# Patient Record
Sex: FEMALE | Race: WHITE | NOT HISPANIC OR LATINO | Employment: FULL TIME | URBAN - NONMETROPOLITAN AREA
[De-identification: names, ages, dates, MRNs, and addresses within clinical notes are randomized per-mention and may not be internally consistent; named-entity substitution may affect disease eponyms.]

---

## 2024-09-10 ENCOUNTER — OFFICE VISIT (OUTPATIENT)
Dept: OBSTETRICS AND GYNECOLOGY | Facility: CLINIC | Age: 34
End: 2024-09-10
Payer: COMMERCIAL

## 2024-09-10 VITALS
DIASTOLIC BLOOD PRESSURE: 80 MMHG | WEIGHT: 137.6 LBS | BODY MASS INDEX: 23.49 KG/M2 | HEIGHT: 64 IN | SYSTOLIC BLOOD PRESSURE: 124 MMHG

## 2024-09-10 DIAGNOSIS — Z12.4 SCREENING FOR CERVICAL CANCER: ICD-10-CM

## 2024-09-10 DIAGNOSIS — Z01.419 WELL WOMAN EXAM WITH ROUTINE GYNECOLOGICAL EXAM: Primary | ICD-10-CM

## 2024-09-10 PROCEDURE — 99395 PREV VISIT EST AGE 18-39: CPT | Performed by: PHYSICIAN ASSISTANT

## 2024-09-10 RX ORDER — DESOGESTREL AND ETHINYL ESTRADIOL 0.15-0.03
1 KIT ORAL DAILY
Qty: 84 TABLET | Refills: 3 | Status: SHIPPED | OUTPATIENT
Start: 2024-09-10

## 2024-09-10 NOTE — PROGRESS NOTES
"Subjective   Chief Complaint   Patient presents with    Gynecologic Exam     Last pap done 22-WNL, No complaints       Jackie Newton is a 34 y.o. year old  presenting to be seen for her annual gynecological exam.   Has no complaints or concerns  Desires refills Enskyce ocp  Cycles regular with 4-5 day bleeds  ..Patient's last menstrual period was 2024 (exact date).     Past Medical History:   Diagnosis Date    Abnormal Pap smear of cervix     History of Papanicolaou smear of cervix 2015    WNL        Current Outpatient Medications:     desogestrel-ethinyl estradiol (Enskyce) 0.15-30 MG-MCG per tablet, Take 1 tablet by mouth Daily., Disp: 84 tablet, Rfl: 3    omeprazole (priLOSEC) 20 MG capsule, TAKE 1 CAPSULE BY MOUTH ONCE DAILY 30 MINUTES TO 1 HOUR BEFORE A MEAL, Disp: , Rfl:    No Known Allergies   Past Surgical History:   Procedure Laterality Date     SECTION        Social History     Socioeconomic History    Marital status:    Tobacco Use    Smoking status: Never    Smokeless tobacco: Never   Vaping Use    Vaping status: Never Used   Substance and Sexual Activity    Alcohol use: No    Drug use: No    Sexual activity: Yes     Partners: Male     Birth control/protection: OCP      Family History   Problem Relation Age of Onset    Hypertension Mother     Diabetes Maternal Grandmother     Hypertension Maternal Grandmother        Review of Systems   Constitutional: Negative.    Gastrointestinal: Negative.    Genitourinary: Negative.            Objective   /80   Ht 162.6 cm (64\")   Wt 62.4 kg (137 lb 9.6 oz)   LMP 2024 (Exact Date)   BMI 23.62 kg/m²     Physical Exam  Exam conducted with a chaperone present.   Constitutional:       Appearance: Normal appearance. She is well-developed and well-groomed.   Eyes:      General: Lids are normal.      Extraocular Movements: Extraocular movements intact.      Conjunctiva/sclera: Conjunctivae normal.   Neck:      " Thyroid: No thyroid mass.   Chest:   Breasts:     Breasts are symmetrical.      Right: No inverted nipple, mass, nipple discharge, skin change or tenderness.      Left: No inverted nipple, mass, nipple discharge, skin change or tenderness.   Abdominal:      Palpations: Abdomen is soft.      Tenderness: There is no abdominal tenderness.   Genitourinary:     Labia:         Right: No rash, tenderness or lesion.         Left: No rash, tenderness or lesion.       Urethra: No prolapse, urethral pain, urethral swelling or urethral lesion.      Vagina: No vaginal discharge, tenderness or lesions.      Cervix: No cervical motion tenderness, discharge, friability or lesion.      Uterus: Not enlarged and not tender.       Adnexa:         Right: No mass or tenderness.          Left: No mass or tenderness.     Musculoskeletal:      Cervical back: Normal range of motion.   Lymphadenopathy:      Upper Body:      Right upper body: No axillary adenopathy.      Left upper body: No axillary adenopathy.   Neurological:      General: No focal deficit present.      Mental Status: She is alert and oriented to person, place, and time.   Psychiatric:         Attention and Perception: Attention normal.         Mood and Affect: Mood normal.         Speech: Speech normal.         Behavior: Behavior is cooperative.            Result Review :                   Assessment and Plan  Diagnoses and all orders for this visit:    1. Well woman exam with routine gynecological exam (Primary)    2. Screening for cervical cancer  -     LIQUID-BASED PAP SMEAR WITH HPV GENOTYPING REGARDLESS OF INTERPRETATION ([facility])    Other orders  -     desogestrel-ethinyl estradiol (Enskyce) 0.15-30 MG-MCG per tablet; Take 1 tablet by mouth Daily.  Dispense: 84 tablet; Refill: 3      Patient Instructions   Self breast exam monthly  Regular exercise             This note was electronically signed.    Tess Stearns PA-C   September 10, 2024

## 2024-09-16 LAB — REF LAB TEST METHOD: NORMAL
